# Patient Record
Sex: FEMALE | Race: WHITE | URBAN - METROPOLITAN AREA
[De-identification: names, ages, dates, MRNs, and addresses within clinical notes are randomized per-mention and may not be internally consistent; named-entity substitution may affect disease eponyms.]

---

## 2022-04-09 ENCOUNTER — HISTORICAL (OUTPATIENT)
Dept: ADMINISTRATIVE | Facility: HOSPITAL | Age: 57
End: 2022-04-09

## 2022-04-25 VITALS
SYSTOLIC BLOOD PRESSURE: 163 MMHG | OXYGEN SATURATION: 97 % | BODY MASS INDEX: 26.63 KG/M2 | HEIGHT: 65 IN | DIASTOLIC BLOOD PRESSURE: 98 MMHG | WEIGHT: 159.81 LBS

## 2024-08-25 ENCOUNTER — OFFICE VISIT (OUTPATIENT)
Dept: URGENT CARE | Facility: CLINIC | Age: 59
End: 2024-08-25
Payer: MEDICARE

## 2024-08-25 VITALS
RESPIRATION RATE: 18 BRPM | HEART RATE: 69 BPM | HEIGHT: 65 IN | DIASTOLIC BLOOD PRESSURE: 79 MMHG | OXYGEN SATURATION: 97 % | SYSTOLIC BLOOD PRESSURE: 129 MMHG | TEMPERATURE: 98 F | BODY MASS INDEX: 24.99 KG/M2 | WEIGHT: 150 LBS

## 2024-08-25 DIAGNOSIS — U07.1 COVID-19: Primary | ICD-10-CM

## 2024-08-25 DIAGNOSIS — R05.9 COUGH, UNSPECIFIED TYPE: ICD-10-CM

## 2024-08-25 LAB
CTP QC/QA: YES
SARS-COV-2 AG RESP QL IA.RAPID: POSITIVE

## 2024-08-25 PROCEDURE — 87811 SARS-COV-2 COVID19 W/OPTIC: CPT | Mod: QW,,, | Performed by: NURSE PRACTITIONER

## 2024-08-25 PROCEDURE — 99213 OFFICE O/P EST LOW 20 MIN: CPT | Mod: ,,, | Performed by: NURSE PRACTITIONER

## 2024-08-25 RX ORDER — CHOLECALCIFEROL (VITAMIN D3) 50 MCG
2000 TABLET ORAL
COMMUNITY

## 2024-08-25 RX ORDER — CETIRIZINE HYDROCHLORIDE 10 MG/1
1 TABLET ORAL EVERY MORNING
COMMUNITY

## 2024-08-25 RX ORDER — OMEPRAZOLE 20 MG/1
20 CAPSULE, DELAYED RELEASE ORAL
COMMUNITY

## 2024-08-25 RX ORDER — OLOPATADINE HYDROCHLORIDE 665 UG/1
2 SPRAY NASAL
COMMUNITY

## 2024-08-25 RX ORDER — CYPROHEPTADINE HYDROCHLORIDE 4 MG/1
4 TABLET ORAL DAILY PRN
COMMUNITY

## 2024-08-25 RX ORDER — CARVEDILOL 12.5 MG/1
TABLET ORAL
COMMUNITY

## 2024-08-25 RX ORDER — PRAVASTATIN SODIUM 10 MG/1
10 TABLET ORAL
COMMUNITY

## 2024-08-25 RX ORDER — FLUOXETINE HYDROCHLORIDE 40 MG/1
40 CAPSULE ORAL
COMMUNITY

## 2024-08-25 RX ORDER — ASCORBIC ACID 500 MG
500 TABLET ORAL
COMMUNITY

## 2024-08-25 RX ORDER — HYDROXYZINE HYDROCHLORIDE 25 MG/1
25 TABLET, FILM COATED ORAL DAILY PRN
COMMUNITY
Start: 2024-07-19

## 2024-08-25 RX ORDER — POLYETHYLENE GLYCOL 3350 17 G/17G
17 POWDER, FOR SOLUTION ORAL
COMMUNITY

## 2024-08-25 RX ORDER — ASPIRIN 81 MG/1
1 TABLET ORAL EVERY MORNING
COMMUNITY

## 2024-08-25 RX ORDER — FLUTICASONE PROPIONATE 50 MCG
1 SPRAY, SUSPENSION (ML) NASAL
COMMUNITY

## 2024-08-25 RX ORDER — BUPROPION HYDROCHLORIDE 150 MG/1
150 TABLET, EXTENDED RELEASE ORAL
COMMUNITY

## 2024-08-25 RX ORDER — LAMOTRIGINE 250 MG/1
1 TABLET, EXTENDED RELEASE ORAL 2 TIMES DAILY
COMMUNITY

## 2024-08-25 RX ORDER — OXYBUTYNIN CHLORIDE 5 MG/1
TABLET ORAL
COMMUNITY
Start: 2024-08-15

## 2024-08-25 RX ORDER — FAMOTIDINE 20 MG/1
TABLET, FILM COATED ORAL
COMMUNITY

## 2024-08-25 NOTE — PROGRESS NOTES
"Subjective:      Patient ID: Debra Teresa is a 58 y.o. female.    Vitals:  height is 5' 5" (1.651 m) and weight is 68 kg (150 lb). Her oral temperature is 98.4 °F (36.9 °C). Her blood pressure is 129/79 and her pulse is 69. Her respiration is 18 and oxygen saturation is 97%.     Chief Complaint: Cough     Patient is a 58 y.o. female who presents to urgent care with complaints of cough, congestion, x2 days. Alleviating factors include Mucinex with mild amount of relief. Patient denies fever, headache, sore throat,n/v/d.  Patient and mother states symptoms began roughly on Friday they have improved drastically without any known fever body aches shortness a breath productive cough only having lingering sinus congestion.    ROS   Objective:     Physical Exam   Constitutional: She is oriented to person, place, and time. She appears well-developed. She is cooperative.  Non-toxic appearance. She does not appear ill. No distress.   HENT:   Head: Normocephalic and atraumatic.   Ears:   Right Ear: Hearing, tympanic membrane, external ear and ear canal normal.   Left Ear: Hearing, tympanic membrane, external ear and ear canal normal.   Nose: Nose normal. No mucosal edema, rhinorrhea or nasal deformity. No epistaxis. Right sinus exhibits no maxillary sinus tenderness and no frontal sinus tenderness. Left sinus exhibits no maxillary sinus tenderness and no frontal sinus tenderness.   Mouth/Throat: Uvula is midline, oropharynx is clear and moist and mucous membranes are normal. No trismus in the jaw. Normal dentition. No uvula swelling. No oropharyngeal exudate, posterior oropharyngeal edema or posterior oropharyngeal erythema.   Eyes: Conjunctivae and lids are normal. No scleral icterus.   Neck: Trachea normal and phonation normal. Neck supple. No edema present. No erythema present. No neck rigidity present.   Cardiovascular: Normal rate, regular rhythm, normal heart sounds and normal pulses.   Pulmonary/Chest: Effort normal " and breath sounds normal. No respiratory distress. She has no decreased breath sounds. She has no rhonchi.   Abdominal: Normal appearance.   Musculoskeletal: Normal range of motion.         General: No deformity. Normal range of motion.   Neurological: She is alert and oriented to person, place, and time. She exhibits normal muscle tone. Coordination normal.   Skin: Skin is warm, dry, intact, not diaphoretic and not pale.   Psychiatric: Her speech is normal and behavior is normal. Judgment and thought content normal.   Nursing note and vitals reviewed.         Previous History      Review of patient's allergies indicates:   Allergen Reactions    Sulfa (sulfonamide antibiotics) Other (See Comments) and Rash     Red blotches and throat swells   Red blotches and throat swells    Red blotches and throat swells    Aripiprazole Other (See Comments)     Pacing and hyper    Elevated blood pressure   Pacing and hyper    Gadodiamide     Iodinated contrast media Hives and Other (See Comments)    Lurasidone Other (See Comments)     Hyper activity and pacing    Risperidone analogues Other (See Comments)     Hyperactivity and pacing    Sulfamethoxazole-trimethoprim Other (See Comments)    Gadolinium-containing contrast media Palpitations and Other (See Comments)     19 Inj. Benadryl 25mg iv Takes benadryl po and achieved good outcome . 2016 Premed with Benadryl 25 mg IVP,2017 premed Benadryl 25 mg IVP,  did fine. On -pt took own benadryl 25 mg po and did fine-/88       Past Medical History:   Diagnosis Date    Anxiety     Depression     GERD (gastroesophageal reflux disease)     Hyperlipidemia     Hypertension      Current Outpatient Medications   Medication Instructions    ascorbic acid (vitamin C) (VITAMIN C) 500 mg, Oral    aspirin (ECOTRIN) 81 MG EC tablet 1 tablet, Oral, Every morning    buPROPion (WELLBUTRIN SR) 150 mg, Oral    carvediloL (COREG) 12.5 MG tablet SMARTSI Tablet(s) By Mouth  "Morning-Evening    cetirizine (ZYRTEC) 10 MG tablet 1 tablet, Oral, Every morning    cholecalciferol (vitamin D3) (VITAMIN D3) 2,000 Units, Oral    cyproheptadine (PERIACTIN) 4 mg, Oral, Daily PRN    famotidine (PEPCID) 20 MG tablet SMARTSI Tablet(s) By Mouth Morning-Night    FLUoxetine 40 mg, Oral    fluticasone propionate (FLONASE) 50 mcg/actuation nasal spray 1 spray, Nasal    hydrOXYzine HCL (ATARAX) 25 mg, Oral, Daily PRN    lamoTRIgine 250 mg TR24 1 tablet, Oral, 2 times daily    olopatadine (PATANASE) 0.6 % nasal spray 2 sprays, Nasal    omeprazole (PRILOSEC) 20 mg, Oral    oxybutynin (DITROPAN) 5 MG Tab TAKE 1 TABLET BY MOUTH IN THE MORNING AND BEFORE BEDTIME    polyethylene glycol (GLYCOLAX) 17 g, Oral    pravastatin (PRAVACHOL) 10 mg, Oral    sodium chloride (OCEAN) 0.65 % nasal spray 1 spray, Nasal     Past Surgical History:   Procedure Laterality Date    HYSTERECTOMY      TONSILLECTOMY           Social History     Tobacco Use    Smoking status: Never    Smokeless tobacco: Never   Substance Use Topics    Alcohol use: Not Currently    Drug use: Never        Physical Exam      Vital Signs Reviewed   /79   Pulse 69   Temp 98.4 °F (36.9 °C) (Oral)   Resp 18   Ht 5' 5" (1.651 m)   Wt 68 kg (150 lb)   SpO2 97%   BMI 24.96 kg/m²        Procedures    Procedures     Labs     Results for orders placed or performed in visit on 24   SARS Coronavirus 2 Antigen, POCT Manual Read   Result Value Ref Range    SARS Coronavirus 2 Antigen Positive (A) Negative     Acceptable Yes       Assessment:     1. COVID-19    2. Cough, unspecified type        Plan:     COVID-Take Tylenol (acetaminophen) for fever/aches.  Drink plenty of fluids.     Take OTC Decongestants  (Claritin D/sudafed OR Coricidin for people with HTN) to cut down on the fluid in the lining of your nose and relieve swollen nasal passages and congestion. May also use cough/cold/congestion medication such as Dayquil/Nyquil " and/or antihistamine such as Claritin/Zyrtec/Allegra.  Cepacol sore throat lozenges/spray if needed.     Drink plenty of fluids.  Rest.     Go directly to ER if you experience any SOB, chest pain, or other worrisome symptoms.      If positive for Covid-19, you should stay in isolation until: At least 24 hours have passed since recovery defined as resolution of fever without the use of fever-reducing medications and improvement in symptoms   COVID-19    Cough, unspecified type  -     SARS Coronavirus 2 Antigen, POCT Manual Read

## 2024-11-14 ENCOUNTER — HOSPITAL ENCOUNTER (OUTPATIENT)
Dept: RADIOLOGY | Facility: HOSPITAL | Age: 59
Discharge: HOME | End: 2024-11-14
Attending: NURSE PRACTITIONER
Payer: MEDICARE

## 2024-11-14 DIAGNOSIS — S59.902S: ICD-10-CM

## 2024-11-14 DIAGNOSIS — S59.902S: Primary | ICD-10-CM

## 2024-11-14 PROCEDURE — 73070 X-RAY EXAM OF ELBOW: CPT | Mod: TC,LT
